# Patient Record
Sex: MALE | Race: AMERICAN INDIAN OR ALASKA NATIVE
[De-identification: names, ages, dates, MRNs, and addresses within clinical notes are randomized per-mention and may not be internally consistent; named-entity substitution may affect disease eponyms.]

---

## 2018-09-11 ENCOUNTER — HOSPITAL ENCOUNTER (EMERGENCY)
Dept: HOSPITAL 5 - ED | Age: 38
LOS: 1 days | Discharge: HOME | End: 2018-09-12
Payer: SELF-PAY

## 2018-09-11 DIAGNOSIS — N39.0: Primary | ICD-10-CM

## 2018-09-11 DIAGNOSIS — Z87.891: ICD-10-CM

## 2018-09-11 DIAGNOSIS — Z88.6: ICD-10-CM

## 2018-09-11 PROCEDURE — 99283 EMERGENCY DEPT VISIT LOW MDM: CPT

## 2018-09-11 PROCEDURE — 81001 URINALYSIS AUTO W/SCOPE: CPT

## 2018-09-11 PROCEDURE — 96372 THER/PROPH/DIAG INJ SC/IM: CPT

## 2018-09-11 PROCEDURE — 87591 N.GONORRHOEAE DNA AMP PROB: CPT

## 2018-09-12 VITALS — DIASTOLIC BLOOD PRESSURE: 75 MMHG | SYSTOLIC BLOOD PRESSURE: 128 MMHG

## 2018-09-12 LAB
BILIRUB UR QL STRIP: (no result)
BLOOD UR QL VISUAL: (no result)
MUCOUS THREADS #/AREA URNS HPF: (no result) /HPF
PH UR STRIP: 6 [PH] (ref 5–7)
PROT UR STRIP-MCNC: (no result) MG/DL
RBC #/AREA URNS HPF: 2 /HPF (ref 0–6)
UROBILINOGEN UR-MCNC: 2 MG/DL (ref ?–2)
WBC #/AREA URNS HPF: 18 /HPF (ref 0–6)

## 2018-09-12 NOTE — EMERGENCY DEPARTMENT REPORT
HPI





- General


Chief Complaint: Urogenital-Male


Time Seen by Provider: 09/12/18 03:21





- HPI


HPI: 





Room 39





The patient is a 38-year-old male presenting with chief complaint of dysuria.  

The patient's A for 2 weeks she's had burning with urination.  The patient 

states he noticed a clear penile discharge.  The patient states his girlfriend 

was diagnosed with a UTI but he is not aware of any STDs.  Patient denies any 

other complaints





Location: Genitourinary system


Duration: 2 Weeks


Quality: Burning


Severity: Moderate


Modifying factors: [see above]


Context: [see above]


Mode of transportation: Unknown





ED Past Medical Hx





- Past Medical History


Previous Medical History?: No





- Surgical History


Past Surgical History?: No





- Family History


Family history: no significant





- Social History


Smoking Status: Former Smoker (none since 2001)


Substance Use Type: None (denies illicit drug use), Alcohol (occasional)





- Medications


Home Medications: 


 Home Medications











 Medication  Instructions  Recorded  Confirmed  Last Taken  Type


 


Ibuprofen [Motrin] 800 mg PO Q8H #30 tablet 10/14/14  Unknown Rx


 


methOCARBAMOL [Robaxin] 500 mg PO BID #30 tab 10/14/14  Unknown Rx


 


traMADol [Ultram 50 MG tab] 50 mg PO Q6HR PRN #20 tablet 10/14/14  Unknown Rx


 


cephALEXin [Keflex] 500 mg PO Q6H #28 capsule 05/03/15  Unknown Rx


 


traMADol [Ultram 50 MG tab] 50 mg PO Q6HR PRN #20 tablet 05/03/15  Unknown Rx


 


Doxycycline [Vibramycin CAP] 100 mg PO Q12HR #14 capsule 09/12/18  Unknown Rx


 


Phenazopyridine [Pyridium] 200 mg PO TID #6 tab 09/12/18  Unknown Rx














ED Review of Systems


ROS: 


Stated complaint: BURNING WITH URINATION


Other details as noted in HPI





Constitutional: denies: fever


Eyes: denies: eye pain


ENT: denies: throat pain


Respiratory: no symptoms reported


Cardiovascular: denies: chest pain


Endocrine: no symptoms reported


Gastrointestinal: denies: abdominal pain


Genitourinary: dysuria, discharge


Musculoskeletal: denies: back pain


Neurological: denies: headache





Physical Exam





- Physical Exam


Vital Signs: 


 Vital Signs











  09/12/18





  00:25


 


Temperature 98.2 F


 


Pulse Rate 78


 


Respiratory 18





Rate 


 


Blood Pressure 128/75


 


O2 Sat by Pulse 100





Oximetry 











Physical Exam: 





GENERAL: The patient is well-developed well-nourished male sitting on stretcher 

not appearing to be in acute distress. []


HEENT: Normocephalic.  Atraumatic.  Extraocular motions are intact.  Patient 

has moist mucous membranes.


NECK: Supple.  Trachea midline


CHEST/LUNGS: There is no respiratory distress noted.


HEART/CARDIOVASCULAR: Regular.  There is no tachycardia.  There is no gallop 

rub or murmur.


ABDOMEN: There is no abdominal distention


SKIN: There is no rash.  There is no edema.  There is no diaphoresis.


NEURO: The patient is awake, alert, and oriented.  The patient is cooperative.  

The patient has normal speech and gait.


MUSCULOSKELETAL: There is no evidence of acute injury.





ED Course


 Vital Signs











  09/12/18





  00:25


 


Temperature 98.2 F


 


Pulse Rate 78


 


Respiratory 18





Rate 


 


Blood Pressure 128/75


 


O2 Sat by Pulse 100





Oximetry 














ED Medical Decision Making





- Lab Data





 Laboratory Tests











  09/12/18





  Unknown


 


Urine Color  Yellow


 


Urine Turbidity  Clear


 


Urine pH  6.0


 


Ur Specific Gravity  1.024


 


Urine Protein  <15 mg/dl


 


Urine Glucose (UA)  Neg


 


Urine Ketones  Neg


 


Urine Blood  Neg


 


Urine Nitrite  Neg


 


Urine Bilirubin  Neg


 


Urine Urobilinogen  2.0


 


Ur Leukocyte Esterase  Tr


 


Urine WBC (Auto)  18.0 H


 


Urine RBC (Auto)  2.0


 


Urine Mucus  Few














- Differential Diagnosis


urethritis, UTI, gonorrhea chlamydia


Critical care attestation.: 


If time is entered above; I have spent that time in minutes in the direct care 

of this critically ill patient, excluding procedure time.








ED Disposition


Clinical Impression: 


 Dysuria, Pyuria, Penile discharge





Disposition: DC-01 TO HOME OR SELFCARE


Is pt being admited?: No


Does the pt Need Aspirin: No


Condition: Stable


Instructions:  Chlamydia Infection (ED), Sexually Transmitted Diseases (ED), 

Urinary Tract Infection in Men (ED)


Additional Instructions: 


Return to the emergency department immediately should you develop worsening 

symptoms, fever, inability to tolerate food or liquid or any other concerns.


Prescriptions: 


Doxycycline [Vibramycin CAP] 100 mg PO Q12HR #14 capsule


Phenazopyridine [Pyridium] 200 mg PO TID #6 tab


Referrals: 


[facility] CARE,MD [Primary Care Provider] - 3-5 Days


[facility] [Outside] - 3-5 Days


Time of Disposition: 03:33